# Patient Record
Sex: MALE | Race: BLACK OR AFRICAN AMERICAN | NOT HISPANIC OR LATINO | Employment: FULL TIME | URBAN - METROPOLITAN AREA
[De-identification: names, ages, dates, MRNs, and addresses within clinical notes are randomized per-mention and may not be internally consistent; named-entity substitution may affect disease eponyms.]

---

## 2017-07-16 ENCOUNTER — HOSPITAL ENCOUNTER (EMERGENCY)
Facility: HOSPITAL | Age: 42
Discharge: HOME | End: 2017-07-17
Payer: MEDICAID

## 2017-07-16 DIAGNOSIS — D72.829 LEUKOCYTOSIS, UNSPECIFIED TYPE: ICD-10-CM

## 2017-07-16 DIAGNOSIS — R10.9 RIGHT SIDED ABDOMINAL PAIN: ICD-10-CM

## 2017-07-16 DIAGNOSIS — K85.90 ACUTE PANCREATITIS, UNSPECIFIED COMPLICATION STATUS, UNSPECIFIED PANCREATITIS TYPE: ICD-10-CM

## 2017-07-16 DIAGNOSIS — K02.9 PAIN DUE TO DENTAL CARIES: ICD-10-CM

## 2017-07-16 DIAGNOSIS — K52.9 COLITIS: Primary | ICD-10-CM

## 2017-07-16 LAB
ALBUMIN SERPL BCP-MCNC: 3.3 G/DL
ALP SERPL-CCNC: 80 U/L
ALT SERPL W/O P-5'-P-CCNC: 21 U/L
ANION GAP SERPL CALC-SCNC: 11 MMOL/L
AST SERPL-CCNC: 19 U/L
BASOPHILS # BLD AUTO: 0.02 K/UL
BASOPHILS NFR BLD: 0.1 %
BILIRUB SERPL-MCNC: 0.2 MG/DL
BUN SERPL-MCNC: 13 MG/DL
CALCIUM SERPL-MCNC: 9.4 MG/DL
CHLORIDE SERPL-SCNC: 105 MMOL/L
CO2 SERPL-SCNC: 24 MMOL/L
CREAT SERPL-MCNC: 1.2 MG/DL
DIFFERENTIAL METHOD: ABNORMAL
EOSINOPHIL # BLD AUTO: 0.1 K/UL
EOSINOPHIL NFR BLD: 0.9 %
ERYTHROCYTE [DISTWIDTH] IN BLOOD BY AUTOMATED COUNT: 14 %
EST. GFR  (AFRICAN AMERICAN): >60 ML/MIN/1.73 M^2
EST. GFR  (NON AFRICAN AMERICAN): >60 ML/MIN/1.73 M^2
GLUCOSE SERPL-MCNC: 84 MG/DL
HCT VFR BLD AUTO: 40.7 %
HGB BLD-MCNC: 14.6 G/DL
LIPASE SERPL-CCNC: 73 U/L
LYMPHOCYTES # BLD AUTO: 3.6 K/UL
LYMPHOCYTES NFR BLD: 23.7 %
MCH RBC QN AUTO: 31.3 PG
MCHC RBC AUTO-ENTMCNC: 35.9 %
MCV RBC AUTO: 87 FL
MONOCYTES # BLD AUTO: 0.9 K/UL
MONOCYTES NFR BLD: 5.5 %
NEUTROPHILS # BLD AUTO: 10.7 K/UL
NEUTROPHILS NFR BLD: 69.8 %
PLATELET # BLD AUTO: 271 K/UL
PMV BLD AUTO: 9.5 FL
POTASSIUM SERPL-SCNC: 3.8 MMOL/L
PROT SERPL-MCNC: 7.3 G/DL
RBC # BLD AUTO: 4.66 M/UL
SODIUM SERPL-SCNC: 140 MMOL/L
WBC # BLD AUTO: 15.37 K/UL

## 2017-07-16 PROCEDURE — 96375 TX/PRO/DX INJ NEW DRUG ADDON: CPT

## 2017-07-16 PROCEDURE — 80053 COMPREHEN METABOLIC PANEL: CPT

## 2017-07-16 PROCEDURE — 85025 COMPLETE CBC W/AUTO DIFF WBC: CPT

## 2017-07-16 PROCEDURE — 99284 EMERGENCY DEPT VISIT MOD MDM: CPT | Mod: 25

## 2017-07-16 PROCEDURE — 25500020 PHARM REV CODE 255: Performed by: PHYSICIAN ASSISTANT

## 2017-07-16 PROCEDURE — 96374 THER/PROPH/DIAG INJ IV PUSH: CPT

## 2017-07-16 PROCEDURE — 63600175 PHARM REV CODE 636 W HCPCS: Performed by: PHYSICIAN ASSISTANT

## 2017-07-16 PROCEDURE — 83690 ASSAY OF LIPASE: CPT

## 2017-07-16 RX ORDER — ONDANSETRON 2 MG/ML
4 INJECTION INTRAMUSCULAR; INTRAVENOUS
Status: COMPLETED | OUTPATIENT
Start: 2017-07-16 | End: 2017-07-16

## 2017-07-16 RX ORDER — MORPHINE SULFATE 4 MG/ML
4 INJECTION, SOLUTION INTRAMUSCULAR; INTRAVENOUS
Status: COMPLETED | OUTPATIENT
Start: 2017-07-16 | End: 2017-07-16

## 2017-07-16 RX ADMIN — MORPHINE SULFATE 4 MG: 4 INJECTION, SOLUTION INTRAMUSCULAR; INTRAVENOUS at 11:07

## 2017-07-16 RX ADMIN — ONDANSETRON 4 MG: 2 INJECTION INTRAMUSCULAR; INTRAVENOUS at 11:07

## 2017-07-16 RX ADMIN — IOHEXOL 75 ML: 350 INJECTION, SOLUTION INTRAVENOUS at 11:07

## 2017-07-17 VITALS
RESPIRATION RATE: 18 BRPM | HEIGHT: 66 IN | DIASTOLIC BLOOD PRESSURE: 99 MMHG | WEIGHT: 206 LBS | SYSTOLIC BLOOD PRESSURE: 162 MMHG | OXYGEN SATURATION: 100 % | HEART RATE: 75 BPM | BODY MASS INDEX: 33.11 KG/M2 | TEMPERATURE: 99 F

## 2017-07-17 PROCEDURE — 25000003 PHARM REV CODE 250: Performed by: PHYSICIAN ASSISTANT

## 2017-07-17 RX ORDER — CIPROFLOXACIN 500 MG/1
500 TABLET ORAL EVERY 12 HOURS
Qty: 19 TABLET | Refills: 0 | Status: SHIPPED | OUTPATIENT
Start: 2017-07-17 | End: 2017-07-27

## 2017-07-17 RX ORDER — CIPROFLOXACIN 500 MG/1
500 TABLET ORAL
Status: COMPLETED | OUTPATIENT
Start: 2017-07-17 | End: 2017-07-17

## 2017-07-17 RX ORDER — METOCLOPRAMIDE 10 MG/1
10 TABLET ORAL EVERY 6 HOURS PRN
Qty: 10 TABLET | Refills: 0 | Status: SHIPPED | OUTPATIENT
Start: 2017-07-17

## 2017-07-17 RX ORDER — METRONIDAZOLE 500 MG/1
500 TABLET ORAL
Status: COMPLETED | OUTPATIENT
Start: 2017-07-17 | End: 2017-07-17

## 2017-07-17 RX ORDER — METRONIDAZOLE 500 MG/1
500 TABLET ORAL EVERY 12 HOURS
Qty: 19 TABLET | Refills: 0 | Status: SHIPPED | OUTPATIENT
Start: 2017-07-17 | End: 2017-07-27

## 2017-07-17 RX ADMIN — CIPROFLOXACIN HYDROCHLORIDE 500 MG: 500 TABLET, FILM COATED ORAL at 12:07

## 2017-07-17 RX ADMIN — METRONIDAZOLE 500 MG: 500 TABLET ORAL at 12:07

## 2017-07-17 NOTE — ED PROVIDER NOTES
SCRIBE #1 NOTE: I, Nika Acosta, am scribing for, and in the presence of, NACHO Mcdonald. I have scribed the entire note.      History      Chief Complaint   Patient presents with    Abdominal Pain     right sided. since friday. denies n/v/d or urinary complaints.     Dental Pain     left uppers       Review of patient's allergies indicates:  No Known Allergies     HPI   HPI    7/16/2017, 10:15 PM   History obtained from the patient      History of Present Illness: Hamlet Ross is a 41 y.o. male patient who presents to the Emergency Department for abd pain which onset suddenly 2 days ago. Symptoms are constant and moderate in severity. Pt reports the pain is located to his right abdomen. Pt states that certain movements and positions exacerbate the pain. He reports having a normal appetite. He states that eating does not affect his pain. No mitigating or exacerbating factors reported. Pt reports having a beer and a shot of crown royal before checking into the ER. He states that he drinks 4-5 days per week. Pt also c/o left upper dental pain for the past week. No associated sxs at this time. Patient denies any facial swelling, CP, SOB, N/V/D, constipation, dysuria, frequent urination, appetite changes, and all other sxs at this time. No prior Tx. No further complaints or concerns at this time.         Arrival mode: Personal vehicle    PCP: Primary Doctor No       Past Medical History:  Past Medical History:   Diagnosis Date    Alcohol consumption of more than four drinks per week     Hernia     umbilical    Hyperlipidemia     Hypertension        Past Surgical History:  Past Surgical History:   Procedure Laterality Date    WRIST SURGERY           Family History:  Family History   Problem Relation Age of Onset    Arthritis Mother     Asthma Mother     Stroke Mother     Hypertension Mother     Hyperlipidemia Mother     Cancer Father     Asthma Brother        Social History:  Social  History     Social History Main Topics    Smoking status: Current Every Day Smoker     Packs/day: 0.50    Smokeless tobacco: Not given    Alcohol use Yes      Comment: occasionally    Drug use: No    Sexual activity: Not given       ROS   Review of Systems   Constitutional: Negative for activity change, appetite change, chills, diaphoresis, fatigue and fever.   HENT: Positive for dental problem. Negative for drooling, ear pain, facial swelling, sore throat, trouble swallowing and voice change.    Eyes: Negative for pain, redness and visual disturbance.   Respiratory: Negative for cough, chest tightness, shortness of breath, wheezing and stridor.    Cardiovascular: Negative for chest pain, palpitations and leg swelling.   Gastrointestinal: Positive for abdominal pain. Negative for abdominal distention, blood in stool, constipation, diarrhea, nausea and vomiting.   Genitourinary: Negative for decreased urine volume, dysuria, flank pain, frequency and urgency.   Musculoskeletal: Negative for back pain, gait problem and myalgias.   Skin: Negative for color change and rash.   Allergic/Immunologic: Negative for immunocompromised state.   Neurological: Negative for dizziness, seizures, syncope, speech difficulty, weakness, light-headedness, numbness and headaches.   Hematological: Negative for adenopathy.   Psychiatric/Behavioral: Negative for agitation, behavioral problems and confusion.   All other systems reviewed and are negative.      Physical Exam      Initial Vitals [07/16/17 2159]   BP Pulse Resp Temp SpO2   (!) 155/92 (!) 111 19 98.8 °F (37.1 °C) 98 %      MAP       113          Physical Exam  Nursing Notes and Vital Signs Reviewed.  Constitutional: Patient is in mild distress. Well-developed and well-nourished. Ambulatory. Accompanied by his wife.   Head: Normocephalic.  Eyes: PERRL. Sclera white. No icterus. No periorbital swelling or erythema.   Mouth/Throat: No evident facial swelling. Patient handles  "secretions normally. No palpable fluctuance. No evidence of periodontal or periapical abscess. No trismus. Chronic appearing caries to multiple teeth. No induration to oral floor.   Neck: Supple. Full ROM. No lymphadenopathy.  Cardiovascular: Regular rate. Regular rhythm.   Pulmonary/Chest: No respiratory distress. No cough.   Abdominal: Soft and non-distended. There is= diffuse moderate right sided abdominal  Tenderness to palpation. Voluntary guarding. No rebound or rigidity. Negative Rodríguez's sign. Negative McBurney's point.   Genitourinary: No CVA tenderness  Musculoskeletal: Moves all extremities. No obvious deformities. No edema. No calf tenderness.  Skin: Warm and dry. No jaundice. No rash.   Neurological:  Alert, awake, and appropriate. Normal gait. Normal speech. AAO x 3. No facial droop. No focal deficit.   Psychiatric: Normal affect. Good eye contact. Appropriate in content.    ED Course    Procedures  ED Vital Signs:  Vitals:    07/16/17 2159 07/16/17 2335 07/17/17 0008   BP: (!) 155/92 (!) 156/91 (!) 162/99   Pulse: (!) 111 78 75   Resp: 19 18 18   Temp: 98.8 °F (37.1 °C)     TempSrc: Oral     SpO2: 98% 100%    Weight: 93.4 kg (206 lb)     Height: 5' 6" (1.676 m)         Abnormal Lab Results:  Labs Reviewed   CBC W/ AUTO DIFFERENTIAL - Abnormal; Notable for the following:        Result Value    WBC 15.37 (*)     MCH 31.3 (*)     Gran # 10.7 (*)     All other components within normal limits   COMPREHENSIVE METABOLIC PANEL - Abnormal; Notable for the following:     Albumin 3.3 (*)     All other components within normal limits   LIPASE - Abnormal; Notable for the following:     Lipase 73 (*)     All other components within normal limits   URINALYSIS     Results for orders placed or performed during the hospital encounter of 07/16/17   CBC auto differential   Result Value Ref Range    WBC 15.37 (H) 3.90 - 12.70 K/uL    RBC 4.66 4.60 - 6.20 M/uL    Hemoglobin 14.6 14.0 - 18.0 g/dL    Hematocrit 40.7 40.0 - " 54.0 %    MCV 87 82 - 98 fL    MCH 31.3 (H) 27.0 - 31.0 pg    MCHC 35.9 32.0 - 36.0 %    RDW 14.0 11.5 - 14.5 %    Platelets 271 150 - 350 K/uL    MPV 9.5 9.2 - 12.9 fL    Gran # 10.7 (H) 1.8 - 7.7 K/uL    Lymph # 3.6 1.0 - 4.8 K/uL    Mono # 0.9 0.3 - 1.0 K/uL    Eos # 0.1 0.0 - 0.5 K/uL    Baso # 0.02 0.00 - 0.20 K/uL    Gran% 69.8 38.0 - 73.0 %    Lymph% 23.7 18.0 - 48.0 %    Mono% 5.5 4.0 - 15.0 %    Eosinophil% 0.9 0.0 - 8.0 %    Basophil% 0.1 0.0 - 1.9 %    Differential Method Automated    Comprehensive metabolic panel   Result Value Ref Range    Sodium 140 136 - 145 mmol/L    Potassium 3.8 3.5 - 5.1 mmol/L    Chloride 105 95 - 110 mmol/L    CO2 24 23 - 29 mmol/L    Glucose 84 70 - 110 mg/dL    BUN, Bld 13 6 - 20 mg/dL    Creatinine 1.2 0.5 - 1.4 mg/dL    Calcium 9.4 8.7 - 10.5 mg/dL    Total Protein 7.3 6.0 - 8.4 g/dL    Albumin 3.3 (L) 3.5 - 5.2 g/dL    Total Bilirubin 0.2 0.1 - 1.0 mg/dL    Alkaline Phosphatase 80 55 - 135 U/L    AST 19 10 - 40 U/L    ALT 21 10 - 44 U/L    Anion Gap 11 8 - 16 mmol/L    eGFR if African American >60 >60 mL/min/1.73 m^2    eGFR if non African American >60 >60 mL/min/1.73 m^2   Lipase   Result Value Ref Range    Lipase 73 (H) 4 - 60 U/L         All Lab Results:  Results for orders placed or performed during the hospital encounter of 07/16/17   CBC auto differential   Result Value Ref Range    WBC 15.37 (H) 3.90 - 12.70 K/uL    RBC 4.66 4.60 - 6.20 M/uL    Hemoglobin 14.6 14.0 - 18.0 g/dL    Hematocrit 40.7 40.0 - 54.0 %    MCV 87 82 - 98 fL    MCH 31.3 (H) 27.0 - 31.0 pg    MCHC 35.9 32.0 - 36.0 %    RDW 14.0 11.5 - 14.5 %    Platelets 271 150 - 350 K/uL    MPV 9.5 9.2 - 12.9 fL    Gran # 10.7 (H) 1.8 - 7.7 K/uL    Lymph # 3.6 1.0 - 4.8 K/uL    Mono # 0.9 0.3 - 1.0 K/uL    Eos # 0.1 0.0 - 0.5 K/uL    Baso # 0.02 0.00 - 0.20 K/uL    Gran% 69.8 38.0 - 73.0 %    Lymph% 23.7 18.0 - 48.0 %    Mono% 5.5 4.0 - 15.0 %    Eosinophil% 0.9 0.0 - 8.0 %    Basophil% 0.1 0.0 - 1.9 %     Differential Method Automated    Comprehensive metabolic panel   Result Value Ref Range    Sodium 140 136 - 145 mmol/L    Potassium 3.8 3.5 - 5.1 mmol/L    Chloride 105 95 - 110 mmol/L    CO2 24 23 - 29 mmol/L    Glucose 84 70 - 110 mg/dL    BUN, Bld 13 6 - 20 mg/dL    Creatinine 1.2 0.5 - 1.4 mg/dL    Calcium 9.4 8.7 - 10.5 mg/dL    Total Protein 7.3 6.0 - 8.4 g/dL    Albumin 3.3 (L) 3.5 - 5.2 g/dL    Total Bilirubin 0.2 0.1 - 1.0 mg/dL    Alkaline Phosphatase 80 55 - 135 U/L    AST 19 10 - 40 U/L    ALT 21 10 - 44 U/L    Anion Gap 11 8 - 16 mmol/L    eGFR if African American >60 >60 mL/min/1.73 m^2    eGFR if non African American >60 >60 mL/min/1.73 m^2   Lipase   Result Value Ref Range    Lipase 73 (H) 4 - 60 U/L         Imaging Results:  Imaging Results          CT Abdomen Pelvis With Contrast (Final result)  Result time 07/17/17 00:01:11    Final result by Ish Gipson Jr., MD (07/17/17 00:01:11)                 Impression:      1.  Subtle findings suggesting some mild wall thickening involving the ascending colon  2.  No acute urinary tract findings.  Normal appendix.    All CT scans at this facility use dose modulation, iterative reconstruction, and/or weight based dosing when appropriate to reduce radiation dose to as low as reasonably achievable.      Electronically signed by: ISH GIPSON MD  Date:     07/17/17  Time:    00:01              Narrative:    EXAM:   CT ABDOMEN PELVIS WITH CONTRAST    CLINICAL HISTORY:  RUQ pain      COMPARISON:  None    TECHNIQUE: Postcontrast axial images of the abdomen and pelvis were obtained Omnipaque 350 was administered intravenously.  Multiplanar reconstruction images were produced.    FINDINGS:   No acute lung base findings.  Gallbladder, bile ducts, liver, spleen, pancreas, adrenal glands, aorta are unremarkable.    Both kidneys excrete contrast.  No obvious renal calculi.  No suspicious kidney mass.  No obstructive uropathy.  Urinary bladder appears  unremarkable.  Prostate gland is not enlarged.    No oral contrast material.  Stomach and duodenum appear unremarkable.  No dilated small bowel loops.  Normal appearing terminal ileum.  Normal appendix.    Some mild wall thickening involving the ascending colon, could correlate with early colitis.  This is a very subtle finding.  No colonic obstruction.  No distal colitis or diverticulitis.    No abscess.  No ascites.  No dominant adenopathy.  Small umbilical fatty hernia.  No acute bone findings.                                      The Emergency Provider reviewed the vital signs and test results, which are outlined above.    ED Discussion     12:14 AM: Discussed with pt all pertinent ED information and results. Discussed pt dx and plan of tx. Gave pt all f/u and return to the ED instructions. All questions and concerns were addressed at this time. Pt expresses understanding of information and instructions, and is comfortable with plan to discharge. Pt is stable for discharge.    I discussed with patient and/or family/caretaker that evaluation in the ED does not suggest any emergent or life threatening medical conditions requiring immediate intervention beyond what was provided in the ED, and I believe patient is safe for discharge.  Regardless, an unremarkable evaluation in the ED does not preclude the development or presence of a serious of life threatening condition. As such, patient was instructed to return immediately for any worsening or change in current symptoms.      ED Medication(s):  Medications   morphine injection 4 mg (4 mg Intravenous Given 7/16/17 2330)   ondansetron injection 4 mg (4 mg Intravenous Given 7/16/17 2330)   omnipaque 350 iohexol 75 mL (75 mLs Intravenous Given 7/16/17 9982)   ciprofloxacin HCl tablet 500 mg (500 mg Oral Given 7/17/17 0022)   metronidazole tablet 500 mg (500 mg Oral Given 7/17/17 0022)       Discharge Medication List as of 7/17/2017 12:14 AM      START taking these  medications    Details   ciprofloxacin HCl (CIPRO) 500 MG tablet Take 1 tablet (500 mg total) by mouth every 12 (twelve) hours., Starting Mon 7/17/2017, Until Thu 7/27/2017, Print      metoclopramide HCl (REGLAN) 10 MG tablet Take 1 tablet (10 mg total) by mouth every 6 (six) hours as needed (Abdominal discomfort and nausea)., Starting Mon 7/17/2017, Print      metronidazole (FLAGYL) 500 MG tablet Take 1 tablet (500 mg total) by mouth every 12 (twelve) hours., Starting Mon 7/17/2017, Until Thu 7/27/2017, Print             Follow-up Information     [facility] - .    Specialty:  Emergency Medicine  Why:  If symptoms worsen in any way or if unimproved.  Contact information:  [address]  [facility] [address] [phone]           [facility] - Gastroenterology. Schedule an appointment as soon as possible for a visit in 2 days.    Specialty:  Gastroenterology  Why:  Follow up with [facility] in the next 1-2 days for re-evaluation and further management.  Contact information:  [address] [facility] [address] [phone]  Additional information:  ([address]) [address] [facility]                   Medical Decision Making    Medical Decision Making:   History:   I obtained history from: someone other than patient.  Old Medical Records: I decided to obtain old medical records.  Initial Assessment:   The patient presents to the ER after drinking Beer and Crown Royal this evening, for an emergent evaluation due to diffuse right sided abdominal pain for the past 2 days, and a toothache x 1 week.   Differential Diagnosis:   Caries, Dental abscess, Cholecystitis, Cholelithiasis, Pancreatitis, Gastritis, GERD, Colitis, Diverticulitis, Appendicitis, Gastroenteritis, Cholangitis, Hernia, Hepatitis, etc   Clinical Tests:   Lab Tests: Ordered and Reviewed  Radiological Study: Ordered and Reviewed  ED Management:  The patient reports feeling better after treatment in the ER  and is requesting to go home.   I discussed his test results and diagnosis. I advised him to call GI clinic in the morning to schedule a close follow up appointment and his wife verbalizes agreement. I advised no more alcohol. I advised rest and clear liquid diet. I instructed him to return to the ER promptly if worse in any way. Cipro and Flagyl started in the ER and Rx's provided.             Scribe Attestation:   Scribe #1: I performed the above scribed service and the documentation accurately describes the services I performed. I attest to the accuracy of the note.    Attending:   Physician Attestation Statement for Scribe #1: I, NACHO Mcdonald, personally performed the services described in this documentation, as scribed by Nika Acosta, in my presence, and it is both accurate and complete.          Clinical Impression       ICD-10-CM ICD-9-CM   1. Colitis K52.9 558.9   2. Right sided abdominal pain R10.9 789.09   3. Acute pancreatitis, unspecified complication status, unspecified pancreatitis type K85.90 577.0   4. Leukocytosis, unspecified type D72.829 288.60   5. Pain due to dental caries K02.9 521.00       Disposition:   Disposition: Discharged  Condition: Stable         Yandel Pollard PA-C  07/17/17 0155